# Patient Record
Sex: MALE | Race: BLACK OR AFRICAN AMERICAN | NOT HISPANIC OR LATINO | URBAN - METROPOLITAN AREA
[De-identification: names, ages, dates, MRNs, and addresses within clinical notes are randomized per-mention and may not be internally consistent; named-entity substitution may affect disease eponyms.]

---

## 2018-01-20 ENCOUNTER — EMERGENCY (EMERGENCY)
Facility: HOSPITAL | Age: 48
LOS: 1 days | Discharge: HOME | End: 2018-01-20
Attending: EMERGENCY MEDICINE | Admitting: EMERGENCY MEDICINE
Payer: MEDICAID

## 2018-01-20 VITALS
HEART RATE: 88 BPM | RESPIRATION RATE: 20 BRPM | SYSTOLIC BLOOD PRESSURE: 128 MMHG | DIASTOLIC BLOOD PRESSURE: 80 MMHG | OXYGEN SATURATION: 100 % | TEMPERATURE: 98 F

## 2018-01-20 PROCEDURE — 29125 APPL SHORT ARM SPLINT STATIC: CPT

## 2018-01-20 PROCEDURE — 99283 EMERGENCY DEPT VISIT LOW MDM: CPT | Mod: 25

## 2018-01-20 PROCEDURE — 73110 X-RAY EXAM OF WRIST: CPT | Mod: 26,RT

## 2018-01-20 RX ORDER — IBUPROFEN 200 MG
600 TABLET ORAL ONCE
Qty: 0 | Refills: 0 | Status: COMPLETED | OUTPATIENT
Start: 2018-01-20 | End: 2018-01-20

## 2018-01-20 RX ADMIN — Medication 600 MILLIGRAM(S): at 09:03

## 2018-01-20 NOTE — ED PROVIDER NOTE - OBJECTIVE STATEMENT
46 y/o M otherwise healthy p/w rt wrist pain. Reports sx have been progressively worsening [id number], no trauma or injury,. Works as an  and uses hands a lot. Pain predominantly along radial aspect of wrist however sometimes migrates across entire wrist, worse w/ movement. No swelling, no rash, no numbness, no weakness, no other joint pain, no h/o prior injury to wrist. Tried joint supplements to no relief. Has not been seen for this pain. +Smoker.

## 2018-01-20 NOTE — ED PROVIDER NOTE - CARE PLAN
Principal Discharge DX:	Tendonitis  Assessment and plan of treatment:	Your xray does not show signs of a fracture. Please take Motrin for pain (400-600mg every 6-8 hours). Use ice for 20 minutes at a time every 1-2 hours for the next two days.  Keep the splint in place until followup, you can remove it for showers. Please follow up with the hand clinic in 1-2 weeks for persistent symptoms.  Return to the ER for increased pain, swelling, weakness or any other concern.

## 2020-12-19 NOTE — ED PROVIDER NOTE - PLAN OF CARE
Patient notified via blinkbo[employer].  
Your xray does not show signs of a fracture. Please take Motrin for pain (400-600mg every 6-8 hours). Use ice for 20 minutes at a time every 1-2 hours for the next two days.  Keep the splint in place until followup, you can remove it for showers. Please follow up with the hand clinic in 1-2 weeks for persistent symptoms.  Return to the ER for increased pain, swelling, weakness or any other concern.

## 2024-04-25 NOTE — ED PROVIDER NOTE - CHPI ED SYMPTOMS NEG
No swelling, no rash, no numbness, no weakness, no other joint pain, no h/o prior injury to wrist none